# Patient Record
Sex: MALE | Race: WHITE | NOT HISPANIC OR LATINO | URBAN - METROPOLITAN AREA
[De-identification: names, ages, dates, MRNs, and addresses within clinical notes are randomized per-mention and may not be internally consistent; named-entity substitution may affect disease eponyms.]

---

## 2022-10-10 ENCOUNTER — TELEPHONE (OUTPATIENT)
Dept: GASTROENTEROLOGY | Facility: CLINIC | Age: 57
End: 2022-10-10

## 2022-10-10 NOTE — TELEPHONE ENCOUNTER
[facility] Call Center    Phone Message    May a detailed message be left on voicemail: yes     Reason for Call: Other: Pt called in wanting to schedule an appt. Per pt, he was seen in Hospital in [facility] and referred to GI at [facility] on the 3rd of this month. Pt states referral got sent in on 3rd, no referral on pt's file. Pt stated wants to schedule an appt as he is in a lot of pain.     Action Taken: Other: gi triage    Travel Screening: Not Applicable

## 2022-10-11 ENCOUNTER — TRANSCRIBE ORDERS (OUTPATIENT)
Dept: OTHER | Age: 57
End: 2022-10-11

## 2022-10-11 DIAGNOSIS — K70.31 ALCOHOLIC CIRRHOSIS OF LIVER WITH ASCITES (H): Primary | ICD-10-CM

## 2022-10-11 DIAGNOSIS — K40.90 RIGHT INGUINAL HERNIA: ICD-10-CM

## 2022-10-11 DIAGNOSIS — K42.9 UMBILICAL HERNIA WITHOUT OBSTRUCTION AND WITHOUT GANGRENE: ICD-10-CM

## 2022-10-13 NOTE — TELEPHONE ENCOUNTER
Patient referral is for the Hepatology clinic, patient is scheduled appropriately.  Closing encounter

## 2022-10-28 NOTE — TELEPHONE ENCOUNTER
DIAGNOSIS: Alcoholic cirrhosis of liver with ascites    Appt Date: 11.29.2022   NOTES STATUS DETAILS   OFFICE NOTE from referring provider Care Everywhere 10.04.2022  Ericka Wise PA-C   [facility]      OFFICE NOTES from other specialists     DISCHARGE SUMMARY from hospital     MEDICATION LIST Care Everywhere    LIVER BIOSPY (IF APPLICABLE)      PATHOLOGY REPORTS      IMAGING     ENDOSCOPY (IF AVAILABLE)     COLONOSCOPY (IF AVAILABLE) Care Everywhere 12.05.2018   ULTRASOUND LIVER Care Everywhere 09.12.2022 US Paracentesis     09.08.2022, 06.05.2022 US Abd RUQ   CT OF ABDOMEN     MRI OF LIVER     FIBROSCAN, US ELASTOGRAPHY, FIBROSIS SCAN, MR ELASTOGRAPHY     LABS     HEPATIC PANEL (LIVER PANEL) Care Everywhere 09.22.2022   BASIC METABOLIC PANEL Care Everywhere 09.22.2022   COMPLETE METABOLIC PANEL Care Everywhere 09.22.2022   COMPLETE BLOOD COUNT (CBC) Care Everywhere 09.22.2022   INTERNATIONAL NORMALIZED RATIO (INR) Care Everywhere 09.22.2022   HEPATITIS C ANTIBODY     HEPATITIS C VIRAL LOAD/PCR     HEPATITIS C GENOTYPE     HEPATITIS B SURFACE ANTIGEN Internal 06.09.2022   HEPATITIS B SURFACE ANTIBODY Internal 06.09.2022   HEPATITIS B DNA QUANT LEVEL     HEPATITIS B CORE ANTIBODY       Action 10.28.2022 RM   Action Taken Called [facility] [phone] spoke to rep who will be pushing over images, pending     Action 11.16.2022 RM   Action Taken Images received and uploaded to chart

## 2022-11-15 DIAGNOSIS — K70.31 ALCOHOLIC CIRRHOSIS OF LIVER WITH ASCITES (H): Primary | ICD-10-CM

## 2022-11-29 ENCOUNTER — PRE VISIT (OUTPATIENT)
Dept: GASTROENTEROLOGY | Facility: CLINIC | Age: 57
End: 2022-11-29

## 2022-11-29 ENCOUNTER — OFFICE VISIT (OUTPATIENT)
Dept: GASTROENTEROLOGY | Facility: CLINIC | Age: 57
End: 2022-11-29
Attending: PHYSICIAN ASSISTANT
Payer: MEDICARE

## 2022-11-29 ENCOUNTER — LAB (OUTPATIENT)
Dept: LAB | Facility: CLINIC | Age: 57
End: 2022-11-29
Attending: PHYSICIAN ASSISTANT
Payer: MEDICARE

## 2022-11-29 VITALS
BODY MASS INDEX: 29.04 KG/M2 | TEMPERATURE: 98.7 F | HEART RATE: 81 BPM | DIASTOLIC BLOOD PRESSURE: 74 MMHG | WEIGHT: 196.1 LBS | OXYGEN SATURATION: 100 % | SYSTOLIC BLOOD PRESSURE: 128 MMHG | HEIGHT: 69 IN | RESPIRATION RATE: 20 BRPM

## 2022-11-29 DIAGNOSIS — K40.90 RIGHT INGUINAL HERNIA: ICD-10-CM

## 2022-11-29 DIAGNOSIS — K70.31 ALCOHOLIC CIRRHOSIS OF LIVER WITH ASCITES (H): ICD-10-CM

## 2022-11-29 DIAGNOSIS — Z11.59 ENCOUNTER FOR SCREENING FOR OTHER VIRAL DISEASES: ICD-10-CM

## 2022-11-29 DIAGNOSIS — K42.9 UMBILICAL HERNIA WITHOUT OBSTRUCTION AND WITHOUT GANGRENE: ICD-10-CM

## 2022-11-29 LAB
ALBUMIN SERPL BCG-MCNC: 4.1 G/DL (ref 3.5–5.2)
ALP SERPL-CCNC: 103 U/L (ref 40–129)
ALT SERPL W P-5'-P-CCNC: 18 U/L (ref 10–50)
ANION GAP SERPL CALCULATED.3IONS-SCNC: 11 MMOL/L (ref 7–15)
AST SERPL W P-5'-P-CCNC: 24 U/L (ref 10–50)
BILIRUB DIRECT SERPL-MCNC: 0.25 MG/DL (ref 0–0.3)
BILIRUB SERPL-MCNC: 0.7 MG/DL
BUN SERPL-MCNC: 16 MG/DL (ref 8–23)
CALCIUM SERPL-MCNC: 9.6 MG/DL (ref 8.6–10)
CHLORIDE SERPL-SCNC: 104 MMOL/L (ref 98–107)
CREAT SERPL-MCNC: 0.76 MG/DL (ref 0.67–1.17)
DEPRECATED HCO3 PLAS-SCNC: 26 MMOL/L (ref 22–29)
ERYTHROCYTE [DISTWIDTH] IN BLOOD BY AUTOMATED COUNT: 13.7 % (ref 10–15)
GFR SERPL CREATININE-BSD FRML MDRD: >90 ML/MIN/1.73M2
GLUCOSE SERPL-MCNC: 89 MG/DL (ref 70–99)
HCT VFR BLD AUTO: 34.8 % (ref 40–53)
HGB BLD-MCNC: 11.7 G/DL (ref 13.3–17.7)
INR PPP: 1.24 (ref 0.85–1.15)
MCH RBC QN AUTO: 34 PG (ref 26.5–33)
MCHC RBC AUTO-ENTMCNC: 33.6 G/DL (ref 31.5–36.5)
MCV RBC AUTO: 101 FL (ref 78–100)
PLATELET # BLD AUTO: 51 10E3/UL (ref 150–450)
POTASSIUM SERPL-SCNC: 4.1 MMOL/L (ref 3.4–5.3)
PROT SERPL-MCNC: 6.7 G/DL (ref 6.4–8.3)
RBC # BLD AUTO: 3.44 10E6/UL (ref 4.4–5.9)
SODIUM SERPL-SCNC: 141 MMOL/L (ref 136–145)
WBC # BLD AUTO: 3.1 10E3/UL (ref 4–11)

## 2022-11-29 PROCEDURE — G0463 HOSPITAL OUTPT CLINIC VISIT: HCPCS

## 2022-11-29 PROCEDURE — 80053 COMPREHEN METABOLIC PANEL: CPT | Performed by: PATHOLOGY

## 2022-11-29 PROCEDURE — 85027 COMPLETE CBC AUTOMATED: CPT | Performed by: PATHOLOGY

## 2022-11-29 PROCEDURE — 99205 OFFICE O/P NEW HI 60 MIN: CPT | Performed by: PHYSICIAN ASSISTANT

## 2022-11-29 PROCEDURE — 82248 BILIRUBIN DIRECT: CPT | Performed by: PATHOLOGY

## 2022-11-29 PROCEDURE — 85610 PROTHROMBIN TIME: CPT | Performed by: PATHOLOGY

## 2022-11-29 PROCEDURE — 36415 COLL VENOUS BLD VENIPUNCTURE: CPT | Performed by: PATHOLOGY

## 2022-11-29 RX ORDER — DOCUSATE SODIUM 100 MG/1
100 CAPSULE, LIQUID FILLED ORAL 2 TIMES DAILY
COMMUNITY
Start: 2022-11-06 | End: 2022-12-06

## 2022-11-29 RX ORDER — TRIAMCINOLONE ACETONIDE 1 MG/G
CREAM TOPICAL
COMMUNITY
Start: 2022-11-09

## 2022-11-29 RX ORDER — OMEPRAZOLE 40 MG/1
1 CAPSULE, DELAYED RELEASE ORAL 2 TIMES DAILY
COMMUNITY
Start: 2022-10-03

## 2022-11-29 RX ORDER — POLYETHYLENE GLYCOL 3350 17 G/17G
17 POWDER, FOR SOLUTION ORAL DAILY
COMMUNITY
Start: 2022-11-06 | End: 2022-12-06

## 2022-11-29 RX ORDER — LACTULOSE 10 G/15ML
60 SOLUTION ORAL DAILY
COMMUNITY
Start: 2022-06-12 | End: 2022-11-29

## 2022-11-29 RX ORDER — IBUPROFEN 600 MG/1
800 TABLET, FILM COATED ORAL 2 TIMES DAILY PRN
COMMUNITY
Start: 2022-11-15

## 2022-11-29 RX ORDER — CHOLECALCIFEROL (VITAMIN D3) 25 MCG
1000 TABLET ORAL DAILY
COMMUNITY
Start: 2022-06-12

## 2022-11-29 RX ORDER — METHOCARBAMOL 500 MG/1
1 TABLET, FILM COATED ORAL 2 TIMES DAILY
COMMUNITY
Start: 2022-11-15

## 2022-11-29 RX ORDER — TRAMADOL HYDROCHLORIDE 50 MG/1
0.5 TABLET ORAL 2 TIMES DAILY PRN
COMMUNITY
Start: 2022-11-15 | End: 2023-03-29

## 2022-11-29 ASSESSMENT — PAIN SCALES - GENERAL: PAINLEVEL: NO PAIN (0)
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
## 2022-11-29 NOTE — PATIENT INSTRUCTIONS
Please let us know if you need a referral for surgery at [facility].     It was a pleasure to see you at your recent visit. Please let me know if you have any questions or concerns.     Clinic Staff - [phone] option 3     Sincerely,     Little Gonzalez PA-C   [address] [place] [address], Mail Code [id number]  [place]  [address].
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
## 2022-11-29 NOTE — NURSING NOTE
"Chief Complaint   Patient presents with     Consult     alcohol liver disease     Vital signs:  Temp: 98.7  F (37.1  C) Temp src: Oral BP: 128/74 Pulse: 81   Resp: 20 SpO2: 100 %     Height: 175.3 cm (5' 9\") Weight: 89 kg (196 lb 1.6 oz)  Estimated body mass index is 28.96 kg/m  as calculated from the following:    Height as of this encounter: 1.753 m (5' 9\").    Weight as of this encounter: 89 kg (196 lb 1.6 oz).        Constanza Zarco, [facility]  11/29/2022 8:53 AM      "

## 2022-12-16 ENCOUNTER — TELEPHONE (OUTPATIENT)
Dept: TRANSPLANT | Facility: CLINIC | Age: 57
End: 2022-12-16

## 2022-12-16 NOTE — TELEPHONE ENCOUNTER
Michael called the [facility] office number;  Is looking to have Inguinal and umbilical hernia repair. Stated he would like to have this done in [facility] but he does not know who to call.  He was last seen here 11/29/2022 by MAXIMO Clark., He would like to discuss with MAXIMO Clark .,      Please call Michael #[phone]

## 2022-12-21 ENCOUNTER — TELEPHONE (OUTPATIENT)
Dept: GASTROENTEROLOGY | Facility: CLINIC | Age: 57
End: 2022-12-21

## 2022-12-21 DIAGNOSIS — K40.90 RIGHT INGUINAL HERNIA: ICD-10-CM

## 2022-12-21 DIAGNOSIS — K42.9 UMBILICAL HERNIA WITHOUT OBSTRUCTION AND WITHOUT GANGRENE: Primary | ICD-10-CM

## 2022-12-21 DIAGNOSIS — K70.31 ALCOHOLIC CIRRHOSIS OF LIVER WITH ASCITES (H): ICD-10-CM

## 2022-12-21 NOTE — TELEPHONE ENCOUNTER
[facility] Call Center    Phone Message    May a detailed message be left on voicemail: yes     Reason for Call: Other: Pt was returning a call. He was thinking it was part of Little Gonzalez's care team. Pt stated he is waiting to get a call back to get his hernia repaired. Pt stated it takes him a while to get to the phone as he has difficulty getting around. Please call him back at [phone]. Thank you.      Action Taken: Other: hepa    Travel Screening: Not Applicable

## 2023-01-06 NOTE — TELEPHONE ENCOUNTER
[facility] Call Center    Phone Message    May a detailed message be left on voicemail: yes     Reason for Call: Other:     Pt called to follow up on their previous call to discuss a referral for hernia repair. Pt is also wishing to discuss if they sholud be having further blood work done.     Action Taken: Message routed to:  [facility]    Travel Screening: Not Applicable

## 2023-01-06 NOTE — CONFIDENTIAL NOTE
Writer called patient for clarification regarding location of hernia repair surgery. The patient said the surgeon in [place] did not feel comfortable proceeding with the hernia repair in the setting of compensated cirrhosis.     Writer communicated this with Little Gonzalez PA-C. It was deemed more appropriate to have the patient complete hernia repair surgery at the [facility] given the surgeon in [place] was uncomfortable performing the surgery. Referral placed by Little Gonzalez PA-C.    Patient agreeable and understanding of the plan.    LEISA Sparrow, RN, PHN  Clinic 3A  Hepatology  [facility]

## 2023-01-16 NOTE — TELEPHONE ENCOUNTER
[facility] Call Center    Phone Message    May a detailed message be left on voicemail: yes     Reason for Call: Other: Pt called following up and requesting a call back in regards to getting hernias repaired. Pt also asked if he needed to go to [place] for labs. Per pt he has a count of fifty percent. Please call pt back at [phone]     Action Taken: Other: hepa    Travel Screening: Not Applicable

## 2023-01-16 NOTE — CONFIDENTIAL NOTE
Returned patient's call. Explained to patient a referral for hernia repair surgery was placed today and labs were not needed for it as he just had them done. Phone number for the [facility] given for patient to schedule the surgery.    GIULIANA SparrowN, RN, PHN  Clinic 3A  Hepatology  [facility]

## 2023-01-19 ENCOUNTER — PREP FOR PROCEDURE (OUTPATIENT)
Dept: SURGERY | Facility: CLINIC | Age: 58
End: 2023-01-19

## 2023-01-19 ENCOUNTER — ANCILLARY PROCEDURE (OUTPATIENT)
Dept: ULTRASOUND IMAGING | Facility: CLINIC | Age: 58
End: 2023-01-19
Attending: PHYSICIAN ASSISTANT
Payer: MEDICARE

## 2023-01-19 ENCOUNTER — OFFICE VISIT (OUTPATIENT)
Dept: SURGERY | Facility: CLINIC | Age: 58
End: 2023-01-19
Payer: MEDICARE

## 2023-01-19 VITALS
DIASTOLIC BLOOD PRESSURE: 69 MMHG | HEIGHT: 69 IN | SYSTOLIC BLOOD PRESSURE: 120 MMHG | HEART RATE: 93 BPM | WEIGHT: 202.8 LBS | OXYGEN SATURATION: 99 % | BODY MASS INDEX: 30.04 KG/M2

## 2023-01-19 DIAGNOSIS — K40.90 NON-RECURRENT UNILATERAL INGUINAL HERNIA WITHOUT OBSTRUCTION OR GANGRENE: Primary | ICD-10-CM

## 2023-01-19 DIAGNOSIS — K42.9 UMBILICAL HERNIA WITHOUT OBSTRUCTION AND WITHOUT GANGRENE: ICD-10-CM

## 2023-01-19 DIAGNOSIS — K70.31 ALCOHOLIC CIRRHOSIS OF LIVER WITH ASCITES (H): ICD-10-CM

## 2023-01-19 DIAGNOSIS — K40.90 RIGHT INGUINAL HERNIA: ICD-10-CM

## 2023-01-19 PROCEDURE — 76705 ECHO EXAM OF ABDOMEN: CPT | Performed by: RADIOLOGY

## 2023-01-19 PROCEDURE — 99203 OFFICE O/P NEW LOW 30 MIN: CPT | Performed by: SURGERY

## 2023-01-19 RX ORDER — CEFAZOLIN SODIUM 2 G/50ML
2 SOLUTION INTRAVENOUS
Status: CANCELLED | OUTPATIENT
Start: 2023-01-19

## 2023-01-19 RX ORDER — ACETAMINOPHEN 325 MG/1
325-650 TABLET ORAL EVERY 6 HOURS PRN
COMMUNITY

## 2023-01-19 RX ORDER — CEFAZOLIN SODIUM 2 G/50ML
2 SOLUTION INTRAVENOUS SEE ADMIN INSTRUCTIONS
Status: CANCELLED | OUTPATIENT
Start: 2023-01-19

## 2023-01-19 ASSESSMENT — PAIN SCALES - GENERAL: PAINLEVEL: NO PAIN (0)
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
## 2023-01-19 NOTE — NURSING NOTE
"Chief Complaint   Patient presents with     New Patient     Umbilical hernia, inguinal hernia, cirrhosis and ascites       Vitals:    01/19/23 0848   BP: 120/69   BP Location: Left arm   Patient Position: Sitting   Cuff Size: Adult Large   Pulse: 93   SpO2: 99%   Weight: 92 kg (202 lb 12.8 oz)   Height: 1.753 m (5' 9\")       Body mass index is 29.95 kg/m .                          Gaston Crawford, EMT    " Attending with

## 2023-01-20 ENCOUNTER — TELEPHONE (OUTPATIENT)
Dept: SURGERY | Facility: CLINIC | Age: 58
End: 2023-01-20
Payer: MEDICARE

## 2023-01-20 PROBLEM — K42.9 UMBILICAL HERNIA WITHOUT OBSTRUCTION AND WITHOUT GANGRENE: Status: ACTIVE | Noted: 2023-01-20

## 2023-01-20 PROBLEM — K40.90 NON-RECURRENT UNILATERAL INGUINAL HERNIA WITHOUT OBSTRUCTION OR GANGRENE: Status: ACTIVE | Noted: 2023-01-20

## 2023-01-20 NOTE — TELEPHONE ENCOUNTER
Patient seen in clinic with Dr. Isaacs yesterday, calling to schedule hernia repair. Scheduled 3/29/23 at the [facility], laparoscopic umbilical hernia repair and right inguinal hernia repair with robotic assist, 150 minutes. Will call patient back with case start time, to arrive 90 minutes prior, 5th floor [facility]. Patient states he will be getting his pre-op H&P at his primary care clinic which is [facility] in [place].

## 2023-01-20 NOTE — TELEPHONE ENCOUNTER
Called patient back with case start time for his hernia surgery on 3/29. It is 7:15 a.m., will need to arrive at 5:45 a.m. Advised patient that his pre-op cannot be more than 30 days prior to surgery. He will call [facility] to schedule. He had an ultrasound yesterday and wanted to know the result. I told him I would pass that request on to Dr. Isaacs's RN Vivien to discuss with patient.

## 2023-03-28 ENCOUNTER — ANESTHESIA EVENT (OUTPATIENT)
Dept: SURGERY | Facility: AMBULATORY SURGERY CENTER | Age: 58
End: 2023-03-28
Payer: MEDICARE

## 2023-03-29 ENCOUNTER — HOSPITAL ENCOUNTER (OUTPATIENT)
Facility: AMBULATORY SURGERY CENTER | Age: 58
Discharge: HOME | End: 2023-03-29
Attending: SURGERY
Payer: MEDICARE

## 2023-03-29 ENCOUNTER — ANESTHESIA (OUTPATIENT)
Dept: SURGERY | Facility: AMBULATORY SURGERY CENTER | Age: 58
End: 2023-03-29
Payer: MEDICARE

## 2023-03-29 VITALS
DIASTOLIC BLOOD PRESSURE: 66 MMHG | WEIGHT: 202 LBS | BODY MASS INDEX: 29.92 KG/M2 | HEIGHT: 69 IN | HEART RATE: 68 BPM | TEMPERATURE: 98.4 F | RESPIRATION RATE: 16 BRPM | SYSTOLIC BLOOD PRESSURE: 148 MMHG | OXYGEN SATURATION: 98 %

## 2023-03-29 DIAGNOSIS — K40.90 NON-RECURRENT UNILATERAL INGUINAL HERNIA WITHOUT OBSTRUCTION OR GANGRENE: ICD-10-CM

## 2023-03-29 DIAGNOSIS — K42.9 UMBILICAL HERNIA WITHOUT OBSTRUCTION AND WITHOUT GANGRENE: ICD-10-CM

## 2023-03-29 PROCEDURE — 49650 LAP ING HERNIA REPAIR INIT: CPT | Mod: RT

## 2023-03-29 PROCEDURE — 49650 LAP ING HERNIA REPAIR INIT: CPT | Mod: RT | Performed by: SURGERY

## 2023-03-29 DEVICE — MESH PROGRIP LAPAROSCOPIC 5.9X3.9" PARIETEX SELF-FIX LPG1510: Type: IMPLANTABLE DEVICE | Site: ABDOMEN | Status: FUNCTIONAL

## 2023-03-29 RX ORDER — FENTANYL CITRATE 50 UG/ML
25 INJECTION, SOLUTION INTRAMUSCULAR; INTRAVENOUS EVERY 5 MIN PRN
Status: DISCONTINUED | OUTPATIENT
Start: 2023-03-29 | End: 2023-03-30 | Stop reason: HOSPADM

## 2023-03-29 RX ORDER — POLYETHYLENE GLYCOL 3350 17 G/17G
1 POWDER, FOR SOLUTION ORAL DAILY
COMMUNITY

## 2023-03-29 RX ORDER — PROPOFOL 10 MG/ML
INJECTION, EMULSION INTRAVENOUS PRN
Status: DISCONTINUED | OUTPATIENT
Start: 2023-03-29 | End: 2023-03-29

## 2023-03-29 RX ORDER — OXYCODONE HYDROCHLORIDE 5 MG/1
5 TABLET ORAL
Status: COMPLETED | OUTPATIENT
Start: 2023-03-29 | End: 2023-03-29

## 2023-03-29 RX ORDER — LIDOCAINE 40 MG/G
CREAM TOPICAL
Status: DISCONTINUED | OUTPATIENT
Start: 2023-03-29 | End: 2023-03-30 | Stop reason: HOSPADM

## 2023-03-29 RX ORDER — TRAMADOL HYDROCHLORIDE 50 MG/1
25 TABLET ORAL
COMMUNITY
Start: 2023-03-28 | End: 2023-03-29

## 2023-03-29 RX ORDER — HYDROMORPHONE HYDROCHLORIDE 1 MG/ML
0.2 INJECTION, SOLUTION INTRAMUSCULAR; INTRAVENOUS; SUBCUTANEOUS EVERY 5 MIN PRN
Status: DISCONTINUED | OUTPATIENT
Start: 2023-03-29 | End: 2023-03-30 | Stop reason: HOSPADM

## 2023-03-29 RX ORDER — CEFAZOLIN SODIUM 2 G/50ML
2 SOLUTION INTRAVENOUS SEE ADMIN INSTRUCTIONS
Status: DISCONTINUED | OUTPATIENT
Start: 2023-03-29 | End: 2023-03-30 | Stop reason: HOSPADM

## 2023-03-29 RX ORDER — SODIUM CHLORIDE, SODIUM LACTATE, POTASSIUM CHLORIDE, CALCIUM CHLORIDE 600; 310; 30; 20 MG/100ML; MG/100ML; MG/100ML; MG/100ML
INJECTION, SOLUTION INTRAVENOUS CONTINUOUS
Status: DISCONTINUED | OUTPATIENT
Start: 2023-03-29 | End: 2023-03-30 | Stop reason: HOSPADM

## 2023-03-29 RX ORDER — ONDANSETRON 2 MG/ML
4 INJECTION INTRAMUSCULAR; INTRAVENOUS EVERY 30 MIN PRN
Status: DISCONTINUED | OUTPATIENT
Start: 2023-03-29 | End: 2023-03-30 | Stop reason: HOSPADM

## 2023-03-29 RX ORDER — OXYCODONE HYDROCHLORIDE 5 MG/1
5 TABLET ORAL EVERY 6 HOURS PRN
Qty: 6 TABLET | Refills: 0 | Status: SHIPPED | OUTPATIENT
Start: 2023-03-29 | End: 2023-04-01

## 2023-03-29 RX ORDER — ACETAMINOPHEN 325 MG/1
650 TABLET ORAL
Status: DISCONTINUED | OUTPATIENT
Start: 2023-03-29 | End: 2023-03-30 | Stop reason: HOSPADM

## 2023-03-29 RX ORDER — KETOROLAC TROMETHAMINE 30 MG/ML
INJECTION, SOLUTION INTRAMUSCULAR; INTRAVENOUS PRN
Status: DISCONTINUED | OUTPATIENT
Start: 2023-03-29 | End: 2023-03-29

## 2023-03-29 RX ORDER — DEXAMETHASONE SODIUM PHOSPHATE 4 MG/ML
INJECTION, SOLUTION INTRA-ARTICULAR; INTRALESIONAL; INTRAMUSCULAR; INTRAVENOUS; SOFT TISSUE PRN
Status: DISCONTINUED | OUTPATIENT
Start: 2023-03-29 | End: 2023-03-29

## 2023-03-29 RX ORDER — CEFAZOLIN SODIUM 2 G/50ML
2 SOLUTION INTRAVENOUS
Status: COMPLETED | OUTPATIENT
Start: 2023-03-29 | End: 2023-03-29

## 2023-03-29 RX ORDER — DOCUSATE SODIUM 100 MG/1
1 CAPSULE, LIQUID FILLED ORAL 2 TIMES DAILY
COMMUNITY
Start: 2023-03-09 | End: 2023-04-08

## 2023-03-29 RX ORDER — PROPOFOL 10 MG/ML
INJECTION, EMULSION INTRAVENOUS CONTINUOUS PRN
Status: DISCONTINUED | OUTPATIENT
Start: 2023-03-29 | End: 2023-03-29

## 2023-03-29 RX ORDER — KETAMINE HYDROCHLORIDE 10 MG/ML
INJECTION INTRAMUSCULAR; INTRAVENOUS PRN
Status: DISCONTINUED | OUTPATIENT
Start: 2023-03-29 | End: 2023-03-29

## 2023-03-29 RX ORDER — FENTANYL CITRATE 50 UG/ML
50 INJECTION, SOLUTION INTRAMUSCULAR; INTRAVENOUS EVERY 5 MIN PRN
Status: DISCONTINUED | OUTPATIENT
Start: 2023-03-29 | End: 2023-03-30 | Stop reason: HOSPADM

## 2023-03-29 RX ORDER — ONDANSETRON 4 MG/1
4 TABLET, ORALLY DISINTEGRATING ORAL EVERY 30 MIN PRN
Status: DISCONTINUED | OUTPATIENT
Start: 2023-03-29 | End: 2023-03-30 | Stop reason: HOSPADM

## 2023-03-29 RX ORDER — BUPIVACAINE HYDROCHLORIDE 2.5 MG/ML
INJECTION, SOLUTION INFILTRATION; PERINEURAL PRN
Status: DISCONTINUED | OUTPATIENT
Start: 2023-03-29 | End: 2023-03-29 | Stop reason: HOSPADM

## 2023-03-29 RX ORDER — TRAMADOL HYDROCHLORIDE 50 MG/1
50 TABLET ORAL EVERY 6 HOURS PRN
Qty: 6 TABLET | Refills: 0 | Status: CANCELLED | OUTPATIENT
Start: 2023-03-29 | End: 2023-04-01

## 2023-03-29 RX ORDER — HYDROMORPHONE HYDROCHLORIDE 1 MG/ML
0.4 INJECTION, SOLUTION INTRAMUSCULAR; INTRAVENOUS; SUBCUTANEOUS EVERY 5 MIN PRN
Status: DISCONTINUED | OUTPATIENT
Start: 2023-03-29 | End: 2023-03-30 | Stop reason: HOSPADM

## 2023-03-29 RX ORDER — ONDANSETRON 2 MG/ML
INJECTION INTRAMUSCULAR; INTRAVENOUS PRN
Status: DISCONTINUED | OUTPATIENT
Start: 2023-03-29 | End: 2023-03-29

## 2023-03-29 RX ORDER — FENTANYL CITRATE 50 UG/ML
INJECTION, SOLUTION INTRAMUSCULAR; INTRAVENOUS PRN
Status: DISCONTINUED | OUTPATIENT
Start: 2023-03-29 | End: 2023-03-29

## 2023-03-29 RX ORDER — ACETAMINOPHEN 325 MG/1
975 TABLET ORAL ONCE
Status: DISCONTINUED | OUTPATIENT
Start: 2023-03-29 | End: 2023-03-30 | Stop reason: HOSPADM

## 2023-03-29 RX ORDER — GLYCOPYRROLATE 0.2 MG/ML
INJECTION, SOLUTION INTRAMUSCULAR; INTRAVENOUS PRN
Status: DISCONTINUED | OUTPATIENT
Start: 2023-03-29 | End: 2023-03-29

## 2023-03-29 RX ORDER — LIDOCAINE HYDROCHLORIDE 20 MG/ML
INJECTION, SOLUTION INFILTRATION; PERINEURAL PRN
Status: DISCONTINUED | OUTPATIENT
Start: 2023-03-29 | End: 2023-03-29

## 2023-03-29 RX ORDER — VITAMIN B COMPLEX
1000 TABLET ORAL
COMMUNITY
Start: 2022-06-12

## 2023-03-29 RX ORDER — DULOXETIN HYDROCHLORIDE 60 MG/1
60 CAPSULE, DELAYED RELEASE ORAL
COMMUNITY
Start: 2023-03-21 | End: 2024-03-20

## 2023-03-29 RX ORDER — SODIUM CHLORIDE, SODIUM LACTATE, POTASSIUM CHLORIDE, CALCIUM CHLORIDE 600; 310; 30; 20 MG/100ML; MG/100ML; MG/100ML; MG/100ML
INJECTION, SOLUTION INTRAVENOUS CONTINUOUS PRN
Status: DISCONTINUED | OUTPATIENT
Start: 2023-03-29 | End: 2023-03-29

## 2023-03-29 RX ADMIN — FENTANYL CITRATE 100 MCG: 50 INJECTION, SOLUTION INTRAMUSCULAR; INTRAVENOUS at 07:23

## 2023-03-29 RX ADMIN — GLYCOPYRROLATE 0.2 MG: 0.2 INJECTION, SOLUTION INTRAMUSCULAR; INTRAVENOUS at 07:43

## 2023-03-29 RX ADMIN — OXYCODONE HYDROCHLORIDE 5 MG: 5 TABLET ORAL at 10:01

## 2023-03-29 RX ADMIN — FENTANYL CITRATE 50 MCG: 50 INJECTION, SOLUTION INTRAMUSCULAR; INTRAVENOUS at 09:42

## 2023-03-29 RX ADMIN — CEFAZOLIN SODIUM 2 G: 2 SOLUTION INTRAVENOUS at 07:14

## 2023-03-29 RX ADMIN — PROPOFOL 200 MCG/KG/MIN: 10 INJECTION, EMULSION INTRAVENOUS at 07:24

## 2023-03-29 RX ADMIN — KETOROLAC TROMETHAMINE 30 MG: 30 INJECTION, SOLUTION INTRAMUSCULAR; INTRAVENOUS at 09:06

## 2023-03-29 RX ADMIN — Medication 50 MG: at 07:23

## 2023-03-29 RX ADMIN — LIDOCAINE HYDROCHLORIDE 100 MG: 20 INJECTION, SOLUTION INFILTRATION; PERINEURAL at 07:23

## 2023-03-29 RX ADMIN — DEXAMETHASONE SODIUM PHOSPHATE 4 MG: 4 INJECTION, SOLUTION INTRA-ARTICULAR; INTRALESIONAL; INTRAMUSCULAR; INTRAVENOUS; SOFT TISSUE at 07:30

## 2023-03-29 RX ADMIN — KETAMINE HYDROCHLORIDE 30 MG: 10 INJECTION INTRAMUSCULAR; INTRAVENOUS at 07:53

## 2023-03-29 RX ADMIN — PROPOFOL 300 MG: 10 INJECTION, EMULSION INTRAVENOUS at 07:23

## 2023-03-29 RX ADMIN — SODIUM CHLORIDE, SODIUM LACTATE, POTASSIUM CHLORIDE, CALCIUM CHLORIDE: 600; 310; 30; 20 INJECTION, SOLUTION INTRAVENOUS at 07:14

## 2023-03-29 RX ADMIN — Medication 10 MG: at 08:27

## 2023-03-29 RX ADMIN — Medication 10 MG: at 08:25

## 2023-03-29 RX ADMIN — FENTANYL CITRATE 50 MCG: 50 INJECTION, SOLUTION INTRAMUSCULAR; INTRAVENOUS at 09:35

## 2023-03-29 RX ADMIN — ONDANSETRON 4 MG: 2 INJECTION INTRAMUSCULAR; INTRAVENOUS at 09:06

## 2023-03-29 RX ADMIN — Medication 0.5 MG: at 08:30

## 2023-03-29 NOTE — OP NOTE
[facility]    Operative Note    Pre-operative diagnosis: Non-recurrent unilateral inguinal hernia without obstruction or gangrene [K40.90]  Umbilical hernia without obstruction and without gangrene [K42.9]   Post-operative diagnosis Same   Procedure: Procedure(s):  Laparoscopic Right Inguinal and Umbilical Hernia Repair, Robotic  Assist   Surgeon: Surgeon(s) and Role:     * Jed Isaacs MD - Primary   Assistant Surgeon      Anesthesia: Jed Mcconnell MD - Resident      General    Estimated blood loss: 40ml   Drains: None   Specimens: * No specimens in log *   Findings: Intraabdominal findings:  No significant adhesions.    RIGHT side findings:  A large hernia with a large hernia sac was found at the indirect location.    Contents of hernia included: nothing;     There was small cord lipoma.    The contents of the spermatic cord were identified and preserved.    Mesh placed to cover hernia defects.    Umbilical findings:  A small umbilical hernia was identified with did not contain any intraabdominal contents       Complications: None.   Implants: Progrip [id number] mesh         INDICATIONS FOR PROCEDURE  OPERATIVE INDICATIONS:  Adam Salcido is a 57 year old male with a history of a right inguinal and umbilical bulges.      After understanding the risks and benefits of proceeding with surgery, the patient has an indication for laparoscopic inguinal and umbilical hernia repairs with robotic assistance and consented to undergo surgery.    I reviewed the general risks of surgery in clinic or on the day of surgery.    These include, but are not limited to, death, myocardial infarction, pneumonia, urinary tract infection, deep venous thrombosis with or without pulmonary embolus, abdominal infection from bowel injury or abscess, bowel obstruction, wound infection, and bleeding.    The risks of hernia repair were reviewed with the patient.    These risks combine  the risks of abdominal surgery and the risks of hernia repair, including mesh implantation, and were described to the patient as follows:    Abdominal surgery risks:    These include, but are not limited to, death, myocardial infarction, pneumonia, urinary tract infection, deep venous thrombosis with or without pulmonary embolus, abdominal infection from bowel injury or abscess, bowel obstruction, wound infection, and bleeding.    Hernia repair risks:    Food and Drug Administration Comments on Hernia Repair Surgery and Mesh Implantation.    http://www.fda.gov/MedicalDevices/ProductsandMedicalProcedures/ImplantsandProsthetics/HerniaSurgicalMesh/default.htm      Hernia Repair Complications    Based on FDA s analysis of medical device adverse event reports and of peer-reviewed, scientific literature, the most common adverse events for all surgical repair of hernias--with or without mesh--are pain, infection, hernia recurrence, scar-like tissue that sticks tissues together (adhesion), blockage of the large or small intestine (obstruction), bleeding, abnormal connection between organs, vessels, or intestines (fistula), fluid build-up at the surgical site (seroma), and a hole in neighboring tissues or organs (perforation).    The most common adverse events following hernia repair with mesh are pain, infection, hernia recurrence, adhesion, and bowel obstruction. Some other potential adverse events that can occur following hernia repair with mesh are mesh migration and mesh shrinkage (contraction).    Many complications related to hernia repair with surgical mesh that have been reported to the FDA have been associated with recalled mesh products that are no longer on the market. Pain, infection, recurrence, adhesion, obstruction, and perforation are the most common complications associated with recalled mesh. In the FDA s analysis of medical adverse event reports to the FDA, recalled mesh products were the main cause of  bowel perforation and obstruction complications.    Please refer to the recall notices here and here for more information if you have recalled mesh. For more information on the recalled products, please visit the FDA Medical Device Recall website. Please visit the Medical & Radiation Emitting Device Database to search a specific type of surgical mesh.    If you are unsure about the specific mesh  and brand used in your surgery and have questions about your hernia repair, contact your surgeon or the facility where your surgery was performed to obtain the information from your medical record.     OPERATIVE PROCEDURE:     Adam Salcido was brought to the operating room and prepared in routine fashion.  The operating room table was . Under the benefits of general anesthesia, a left upper quadrant Veress needle was inserted and pneumoperitoneum was established using carbon dioxide gas to a maximum pressure of 15 mmHg.     A total of three 8-mm ports were placed into the abdomen, the second two under direct optical visualization.    Mesh and suture were placed at this time.    Targeting towards bladder was done.    The robotic arms were docked with robot on patient's left side.    The scissors and grasping robotic tools were placed in lateral ports with camera in between.    The operation was started by examining the right and left groin with findings as noted above.      Right Side:  The peritoneum from anterior superior iliac spine was divided using scissors and a peritoneal flap was developed inferiorly, allowing access to the spermatic cord.  Dissection laterally and medially allowed pocked formation for mesh implant.    A complete dissection of the space was performed by pushing peritoneum down and elevating the inferior epigastric arteries.    The spermatic cord was dissected and peritoneal sac was dissected away from the cord structures; in this manner the vas deferens was identified and  preserved.    Cord lipoma was reduced.    The peritoneal reflection was dissected away from the lower edge of the mesh location.    Mesh was placed anteriorly in the properitoneal space and unfurled from superior to inferior and posteriorly.  This covered all groin hernia defects.    Hemostasis was ensured and the peritoneal flap was sutured closed using 2-0 barbed, absorbable suture.     Umbilical repair:    Using a suture passer the umbilical defect was closed with 0 vicryl suture in a figure of eight fashion.    The needles were removed.    All ports were removed from the abdomen.        The skin was closed at the 3 incisions using 4-0 monocryl suture and skin glue was applied.    I was present for all critical components of the operation and all needle and sponge counts were correct x2 at the end of the procedure.    Jed Isaacs MD  Surgery  [phone] (hospital )        Operative report initially prepared by:  Jed Mcconnell MD

## 2023-03-29 NOTE — ANESTHESIA PREPROCEDURE EVALUATION
Anesthesia Pre-Procedure Evaluation    Patient: Adam Salcido   MRN: [id number] : 1965        Procedure : Procedure(s):  Laparoscopic Right Inguinal and Umbilical Hernia Repair, Robotic  Assist          No past medical history on file.   History reviewed. No pertinent surgical history.   No Known Allergies   Social History     Tobacco Use     Smoking status: Never     Smokeless tobacco: Current     Types: Chew   Substance Use Topics     Alcohol use: Not Currently     Comment: Last drink 2022      Wt Readings from Last 1 Encounters:   23 91.6 kg (202 lb)        Anesthesia Evaluation            ROS/MED HX  ENT/Pulmonary:  - neg pulmonary ROS     Neurologic:  - neg neurologic ROS     Cardiovascular:  - neg cardiovascular ROS     METS/Exercise Tolerance: >4 METS    Hematologic:  - neg hematologic  ROS     Musculoskeletal:  - neg musculoskeletal ROS     GI/Hepatic:  - neg GI/hepatic ROS   (+) liver disease (Hx alcohol abuse ),     Renal/Genitourinary:  - neg Renal ROS     Endo:  - neg endo ROS     Psychiatric/Substance Use:  - neg psychiatric ROS     Infectious Disease:  - neg infectious disease ROS     Malignancy:  - neg malignancy ROS     Other:  - neg other ROS          Physical Exam    Airway        Mallampati: II       Respiratory Devices and Support    ETT:      Dental           Cardiovascular          Rhythm and rate: regular     Pulmonary           breath sounds clear to auscultation           OUTSIDE LABS:  CBC:   Lab Results   Component Value Date    WBC 3.1 (L) 2022    HGB 11.7 (L) 2022    HCT 34.8 (L) 2022    PLT 51 (L) 2022     BMP:   Lab Results   Component Value Date     2022    POTASSIUM 4.1 2022    CHLORIDE 104 2022    CO2 26 2022    BUN 16.0 2022    CR 0.76 2022    GLC 89 2022     COAGS:   Lab Results   Component Value Date    INR 1.24 (H) 2022     POC: No results found for: BGM, HCG, HCGS  HEPATIC:   Lab  Results   Component Value Date    ALBUMIN 4.1 11/29/2022    PROTTOTAL 6.7 11/29/2022    ALT 18 11/29/2022    AST 24 11/29/2022    ALKPHOS 103 11/29/2022    BILITOTAL 0.7 11/29/2022     OTHER:   Lab Results   Component Value Date    SONIYA 9.6 11/29/2022       Anesthesia Plan    ASA Status:  2      Anesthesia Type: General.     - Airway: ETT   Induction: Intravenous.   Maintenance: Balanced.        Consents    Anesthesia Plan(s) and associated risks, benefits, and realistic alternatives discussed. Questions answered and patient/representative(s) expressed understanding.     - Discussed: Risks, Benefits and Alternatives for BOTH SEDATION and the PROCEDURE were discussed     - Discussed with:  Patient         Postoperative Care    Pain management: Multi-modal analgesia.   PONV prophylaxis: Ondansetron (or other 5HT-3), Dexamethasone or Solumedrol     Comments:                Giovanni Gamble MD

## 2023-03-29 NOTE — DISCHARGE INSTRUCTIONS
Discharge Instructions   Activity  - No lifting >20 lbs for 2 weeks    Incisions  - Remove the dressing over your belly button in one week.  You may shower with the dressing on  - You may shower and get incisions wet starting 24 hrs after surgery  - Do not scrub incisions or submerge wounds (e.g. bath, pool, hot tub, etc.) for 2 weeks or until the glue or steri-strips have come off    Drain Care  - You do not have a drain.  Specific care/instructions:  Not Applicable    Medications  - Do not take any additional Tylenol (acetaminophen) while using a narcotic pain medication which includes acetaminophen  - Do not take more than 2,000mg of acetaminophen in any 24 hour period, as this can cause liver damage  - Take stool softeners such as Senna or Miralax while you are using narcotics, but stop if you develop diarrhea  - Wean yourself off of narcotic pain medications    Follow-Up:  - Call your primary care provider to touch base regarding your recent admission.    - Call or return sooner than your regularly scheduled visit if you develop any of the following: fever >101.5, uncontrolled pain, uncontrolled nausea or vomiting, as well as increased redness, swelling, or drainage from your wound.   -  A nurse from the General Surgery Clinic will contact you within 24 hours, or the next business day, after your discharge from the hospital.  If you have questions or to schedule a follow up appointment please call the General Surgery Clinic at [phone].  Call [phone] and ask to speak with the Surgery resident on call if you are having troubles in the evenings, at night, or on the weekend.  -  If you had surgery with Dr Isaacs or Dr Palumbo please call [phone] to schedule your follow up appointment or with any questions or concerns.  -  If you are receiving home care please inform your home care nurse of our contact number.         [facility]  Home Care Following  Anesthesia  For 24 hours after surgery:  Get plenty of rest.  A responsible adult must stay with you for at least 24 hours after you leave the surgery center.  Do not drive or use heavy equipment.  If you have weakness or tingling, don't drive or use heavy equipment until this feeling goes away.   Do not drink alcohol.   Avoid strenuous or risky activities.  Ask for help when climbing stairs.  You may feel lightheaded.  IF so, sit for a few minutes before standing.  Have someone help you get up.   If you have nausea (feel sick to your stomach): Drink only clear liquids such as apple juice, ginger ale, broth or 7-Up.  Rest may also help.  Be sure to drink enough fluids.  Move to a regular diet as you feel able.   You may have a slight fever.  Call the doctor if your fever is over 100 F (37.7 C) (taken under the tongue) or lasts longer than 24 hours.  You may have a dry mouth, a sore throat, muscle aches or trouble sleeping. These should go away after 24 hours.  Do not make important or legal decisions.   It is recommended to avoid smoking.               Tips for taking pain medications  To get the best pain relief possible, remember these points:  Take pain medications as directed, before pain becomes severe.  Pain medication can upset your stomach: taking it with food may help.  Constipation is a common side effect of pain medication. Drink plenty of  fluids.  Eat foods high in fiber. Take a stool softener if recommended by your doctor or pharmacist.  Do not drink alcohol, drive or operate machinery while taking pain medications.  Ask about other ways to control pain, such as with heat, ice or relaxation.    Tylenol/Acetaminophen Consumption  To help encourage the safe use of acetaminophen, the makers of TYLENOL  have lowered the maximum daily dose for single-ingredient Extra Strength TYLENOL  (acetaminophen) products sold in the [place] from 8 pills per day (4,000 mg) to 6 pills per day (3,000 mg). The dosing interval  has also changed from 2 pills every 4-6 hours to 2 pills every 6 hours.  If you feel your pain relief is insufficient, you may take Tylenol/Acetaminophen in addition to your narcotic pain medication.   Be careful not to exceed 3,000 mg of Tylenol/Acetaminophen in a 24 hour period from all sources.  If you are taking extra strength Tylenol/acetaminophen (500 mg), the maximum dose is 6 tablets in 24 hours.  If you are taking regular strength acetaminophen (325 mg), the maximum dose is 9 tablets in 24 hours.  You received Toradol at 9:06 AM today. No NSAID's before 3:06 PM as needed per package instruction.    Call a doctor for any of the following:  Signs of infection (fever, growing tenderness at the surgery site, a large amount of drainage or bleeding, severe pain, foul-smelling drainage, redness, swelling).  It has been over 8 to 10 hours since surgery and you are still not able to urinate (pass water).  Headache for over 24 hours.  Signs of Covid-19 infection (temperature over 100 degrees, shortness of breath, cough, loss of taste/smell, generalized body aches, persistent headache, chills, sore throat, nausea/vomiting/diarrhea)  Your doctor is:  Dr. Jed Isaacs, General Surgery: [phone]                 Or dial [phone] and ask for the resident on call for:  General Surgery  For emergency care, call the:  [facility]:  [phone] (TTY for hearing impaired: [phone])

## 2023-03-29 NOTE — ANESTHESIA POSTPROCEDURE EVALUATION
Patient: Adam Salcido    Procedure: Procedure(s):  Laparoscopic Right Inguinal and Umbilical Hernia Repair, Robotic  Assist       Anesthesia Type:  General    Note:  Disposition: Outpatient   Postop Pain Control: Uneventful            Sign Out: Well controlled pain   PONV: No   Neuro/Psych: Uneventful            Sign Out: Acceptable/Baseline neuro status   Airway/Respiratory: Uneventful            Sign Out: Acceptable/Baseline resp. status   CV/Hemodynamics: Uneventful            Sign Out: Acceptable CV status; No obvious hypovolemia; No obvious fluid overload   Other NRE: NONE   DID A NON-ROUTINE EVENT OCCUR? No           Last vitals:  Vitals Value Taken Time   /70 03/29/23 0945   Temp 36.8  C (98.2  F) 03/29/23 0945   Pulse 65 03/29/23 0946   Resp 6 03/29/23 0946   SpO2 96 % 03/29/23 0947   Vitals shown include unvalidated device data.    Electronically Signed By: Giovanni Gamble MD  March 29, 2023  9:53 AM

## 2023-03-29 NOTE — ANESTHESIA CARE TRANSFER NOTE
Patient: Adam Salcido    Procedure: Procedure(s):  Laparoscopic Right Inguinal and Umbilical Hernia Repair, Robotic  Assist       Diagnosis: Non-recurrent unilateral inguinal hernia without obstruction or gangrene [K40.90]  Umbilical hernia without obstruction and without gangrene [K42.9]  Diagnosis Additional Information: No value filed.    Anesthesia Type:   General     Note:    Oropharynx: spontaneously breathing  Level of Consciousness: awake  Oxygen Supplementation: face mask  Level of Supplemental Oxygen (L/min / FiO2): 6  Independent Airway: airway patency satisfactory and stable  Dentition: dentition unchanged  Vital Signs Stable: post-procedure vital signs reviewed and stable  Report to RN Given: handoff report given  Patient transferred to: PACU    Handoff Report: Identifed the Patient, Identified the Reponsible Provider, Reviewed the pertinent medical history, Discussed the surgical course, Reviewed Intra-OP anesthesia mangement and issues during anesthesia, Set expectations for post-procedure period and Allowed opportunity for questions and acknowledgement of understanding      Vitals:  Vitals Value Taken Time   /82    Temp 96.8    Pulse 65    Resp 20    SpO2 98        Electronically Signed By: VIJAY Olvera CRNA  March 29, 2023  9:24 AM

## 2023-03-31 ENCOUNTER — TELEPHONE (OUTPATIENT)
Dept: SURGERY | Facility: CLINIC | Age: 58
End: 2023-03-31
Payer: MEDICARE

## 2023-03-31 ENCOUNTER — PATIENT OUTREACH (OUTPATIENT)
Dept: ENDOCRINOLOGY | Facility: CLINIC | Age: 58
End: 2023-03-31
Payer: MEDICARE

## 2023-03-31 NOTE — TELEPHONE ENCOUNTER
[facility] Call Center    Phone Message    May a detailed message be left on voicemail: yes     Reason for Call: Medication Refill Request    Has the patient contacted the pharmacy for the refill? Yes   Name of medication being requested: oxyCODONE (ROXICODONE) 5 MG tablet  Provider who prescribed the medication: Jed Mcconnell MD  Pharmacy:[place], [place]   Date medication is needed: ASAP    Pt is requesting a call back to discuss getting a refill for the medication. Pt is requesting more than 6 pills, as they stated they have a high tolerance.     Action Taken: Message routed to:  [facility] ([facility]  Gen Surg    Travel Screening: Not Applicable

## 2023-03-31 NOTE — PROGRESS NOTES
"RN Post-Op/Post-Discharge Care Coordination Note    Mr. Adam Salcido is a 57 year old male who underwent RIH and umbilical hernia repair on 3/29/23 with  Dr. Sanket Isaacs.  Spoke with Patient.    Support  Patient able to care for self independently     Health Status  Nausea/Vomiting: Patient denies nausea/vomiting.  Eating/drinking: Patient is able to eat and drink without any complaints.  Bowel habits: Patient reports having a normal bowel movement.  Drains (VERENA): N/A  Fevers/chills: Patient denies any fever or chills.  Incisions: Patient denies any signs and symptoms of infection..  Wound closure:  Skin Sealant  Pain: 8 - using ice and medication.  Encouraged patient to use Tylenol in between the oxycodone.  New Medications:  Oxycodone - was taking 1 tablet every 6-8 hours.  Patient states he takes high dose ibuprofen, muscle relaxers and Tramadol for back pain.  Patient requesting refill of pain medication and states the current dose \"does not even touch the pain\" he is having.    Per Dr. Isaacs: patient to use ice packs on umbilicus and under scrotum.  Can restart Tramadol.    Activity/Restrictions  No lifting in excess of 15-20 pounds for 3-4 weeks    Equipment  Athletic Supporter    Pathology reviewed with patient:  N/A    Forms/Letters  No    All of her questions were answered including reviewing restrictions, and wound care.  He will call this office if he has any further questions and/or concerns.      In lieu of a post-op clinic patient that patient would like to be contacted in approximately 7-10 days via telephone.    A copy of this note was routed to the primary surgeon.      Whom and When to Call  Patient acknowledges understanding of how to manage any medication changes and   when to seek medical care.     Patient advised that if after hour medical concerns arise to please call [phone] and choose option 4 to speak to the physician on call.                 "

## 2023-04-10 ENCOUNTER — PATIENT OUTREACH (OUTPATIENT)
Dept: ENDOCRINOLOGY | Facility: CLINIC | Age: 58
End: 2023-04-10
Payer: MEDICARE

## 2023-04-10 ENCOUNTER — TELEPHONE (OUTPATIENT)
Dept: SURGERY | Facility: CLINIC | Age: 58
End: 2023-04-10
Payer: MEDICARE

## 2023-04-10 NOTE — TELEPHONE ENCOUNTER
Patient had hernia repair with Dr. Isaacs on 3/29. He would like a nurse to call him because he has some questions. Will forward to clinic RN.

## 2023-04-10 NOTE — PROGRESS NOTES
Patient states he has a bump the size of his thumb.  States he is still bruised. Reassured patient that bruising will resolve.  Can use a heating pad on the area to encourage reabsorption. He is using his support.  Reminded patient no heavy lifting and to be careful with bending and twisting.

## 2023-04-11 ENCOUNTER — TELEPHONE (OUTPATIENT)
Dept: ENDOCRINOLOGY | Facility: CLINIC | Age: 58
End: 2023-04-11
Payer: MEDICARE

## 2023-04-17 ENCOUNTER — CARE COORDINATION (OUTPATIENT)
Dept: ENDOCRINOLOGY | Facility: CLINIC | Age: 58
End: 2023-04-17
Payer: MEDICARE

## 2023-04-17 NOTE — PROGRESS NOTES
"Patient called with update regarding his Laparoscopic Right Inguinal and Umbilical Hernia Repair, Robotic  Assisted on 3/29. He noticed a \"bulge\" by his right testicle about a week ago, it is soft, non tender, he has been wearing his supporter and is pretty sore if he doesn't wear it.   Also, on the left side of his abdomen he has a \"hard knob\" that is still black and blue but seems to be getting better. When he stands up this area seems to stick out.   He just wanted to update Dr Isaacs and find out if these are normal findings.     Denies fever, chills and is having normal bowel movements.       Dr Isaacs informed of above.   "

## 2023-07-07 ENCOUNTER — TELEPHONE (OUTPATIENT)
Dept: SURGERY | Facility: CLINIC | Age: 58
End: 2023-07-07
Payer: MEDICARE

## 2023-07-07 NOTE — TELEPHONE ENCOUNTER
[facility] Call Center    Phone Message    May a detailed message be left on voicemail: yes     Reason for Call: Other: Michael is calling in asking for a call back. Pt states that he would like to speak with his care team, as he has had a new hernia pop out. pt would like to also discuss getting new prescriptions for tramadol and ibuprofin. Please call back as soon as possible to discuss.     Action Taken: Message routed to:  [facility] Gen Surg    Travel Screening: Not Applicable

## 2023-07-07 NOTE — TELEPHONE ENCOUNTER
About 2 weeks ago patient noticed an area to the right of his belly button sticking out and about three days ago he noticed it was larger, fluid filled and the size of his thumb and sticking out about an inch. It popped out more when he bent over. Patient states he has been having normal bowel movements and denies pain.     Will inform Dr Isaacs of above and let patient know next steps.

## 2023-07-07 NOTE — TELEPHONE ENCOUNTER
Dr Isaacs informed of patients possible hernia. No new orders at this time. Patient scheduled to see Dr Isaacs July 20 in clinic. Patient verbalized understanding.

## 2023-07-20 ENCOUNTER — OFFICE VISIT (OUTPATIENT)
Dept: SURGERY | Facility: CLINIC | Age: 58
End: 2023-07-20
Payer: MEDICARE

## 2023-07-20 ENCOUNTER — PATIENT OUTREACH (OUTPATIENT)
Dept: ENDOCRINOLOGY | Facility: CLINIC | Age: 58
End: 2023-07-20

## 2023-07-20 VITALS
HEIGHT: 69 IN | DIASTOLIC BLOOD PRESSURE: 94 MMHG | OXYGEN SATURATION: 100 % | HEART RATE: 59 BPM | BODY MASS INDEX: 30.57 KG/M2 | WEIGHT: 206.4 LBS | SYSTOLIC BLOOD PRESSURE: 164 MMHG

## 2023-07-20 DIAGNOSIS — K42.9 UMBILICAL HERNIA WITHOUT OBSTRUCTION AND WITHOUT GANGRENE: Primary | ICD-10-CM

## 2023-07-20 PROCEDURE — 99214 OFFICE O/P EST MOD 30 MIN: CPT | Performed by: SURGERY

## 2023-07-20 ASSESSMENT — PAIN SCALES - GENERAL: PAINLEVEL: SEVERE PAIN (7)

## 2023-07-20 NOTE — PROGRESS NOTES
"Patient was previously seen in clinic for evaluation of inguinal hernia and I performed surgery on him in March 2023.    Had primary repair of umbilical hernia.    Has quite severe umbilical hernia which is ~2cm in size; causes pain; has some very minimal skin color change due to thin skin; no active cirrhosis.    No bowel obstructions.  No prior hospitalizations due to this hernia.    They underwent the following evaluation since the previous visit and results include:  None.      Adam Salcido overall has had the following complaints since the last visit: hernia popped shortly after the inguinal hernia repair.    Has a minor twinge of pain at right groin.    On exam:  BP (!) 164/94 (BP Location: Left arm, Patient Position: Sitting, Cuff Size: Adult Regular)   Pulse 59   Ht 1.753 m (5' 9\")   Wt 93.6 kg (206 lb 6.4 oz)   SpO2 100%   BMI 30.48 kg/m    Gen: alert, NAD  Lung exam: breathing unlabored  Abdominal exam: soft; nontender; nondistended; incisions c/d/i    There is a 2cm fascial defect right inguinal hernia.    Should have IPOM incisional hernia repair with mesh.    I can do this in [place] at the hospital since he lives just 20 minutes from the hospital.    LABS:   Ordered.      Last complete blood count:  Lab Results   Component Value Date    WBC 3.1 11/29/2022     Lab Results   Component Value Date    RBC 3.44 11/29/2022     Lab Results   Component Value Date    HGB 11.7 11/29/2022     Lab Results   Component Value Date    HCT 34.8 11/29/2022     Lab Results   Component Value Date     11/29/2022     Lab Results   Component Value Date    MCH 34.0 11/29/2022     Lab Results   Component Value Date    MCHC 33.6 11/29/2022     Lab Results   Component Value Date    RDW 13.7 11/29/2022     Lab Results   Component Value Date    PLT 51 11/29/2022       Lab Results   Component Value Date    AST 24 11/29/2022     Lab Results   Component Value Date    ALT 18 11/29/2022     Lab Results   Component Value " Date    ALBUMIN 4.1 11/29/2022     Lab Results   Component Value Date    PROTTOTAL 6.7 11/29/2022       Last Basic Metabolic Panel:  Lab Results   Component Value Date     11/29/2022      Lab Results   Component Value Date    POTASSIUM 4.1 11/29/2022     Lab Results   Component Value Date    CHLORIDE 104 11/29/2022     Lab Results   Component Value Date    SONIYA 9.6 11/29/2022     Lab Results   Component Value Date    CO2 26 11/29/2022     Lab Results   Component Value Date    BUN 16.0 11/29/2022     Lab Results   Component Value Date    CR 0.76 11/29/2022     Lab Results   Component Value Date    GLC 89 11/29/2022       IMAGING:   Not needed for this.    Upper endoscopy:  n/a    Plan:    Laparoscopic umbilical hernia repair with mesh (12 cm [place] of mesh; absorbatack sutures).    Same day surgery to be done in [place].    Call Arielle for scheduling; she will need this note from clinic.    [phone]    She will need note and order for scheduling.    Jed Isaacs MD  Surgery  [phone] (hospital )  [phone] (clinic nurses)        The risks of hernia repair were reviewed with the patient.    These risks combine the risks of abdominal surgery and the risks of hernia repair, including mesh implantation, and were described to the patient as follows:    Abdominal surgery risks:    These include, but are not limited to, death, myocardial infarction, pneumonia, urinary tract infection, deep venous thrombosis with or without pulmonary embolus, abdominal infection from bowel injury or abscess, bowel obstruction, wound infection, and bleeding.    Hernia repair risks:    Food and Drug Administration Comments on Hernia Repair Surgery and Mesh Implantation.    http://www.fda.gov/MedicalDevices/ProductsandMedicalProcedures/ImplantsandProsthetics/HerniaSurgicalMesh/default.htm      Hernia Repair Complications    Based on FDA s analysis of medical device adverse event reports and of peer-reviewed,  scientific literature, the most common adverse events for all surgical repair of hernias--with or without mesh--are pain, infection, hernia recurrence, scar-like tissue that sticks tissues together (adhesion), blockage of the large or small intestine (obstruction), bleeding, abnormal connection between organs, vessels, or intestines (fistula), fluid build-up at the surgical site (seroma), and a hole in neighboring tissues or organs (perforation).    The most common adverse events following hernia repair with mesh are pain, infection, hernia recurrence, adhesion, and bowel obstruction. Some other potential adverse events that can occur following hernia repair with mesh are mesh migration and mesh shrinkage (contraction).    Many complications related to hernia repair with surgical mesh that have been reported to the FDA have been associated with recalled mesh products that are no longer on the market. Pain, infection, recurrence, adhesion, obstruction, and perforation are the most common complications associated with recalled mesh. In the FDA s analysis of medical adverse event reports to the FDA, recalled mesh products were the main cause of bowel perforation and obstruction complications.    Please refer to the recall notices here and here for more information if you have recalled mesh. For more information on the recalled products, please visit the FDA Medical Device Recall website. Please visit the Medical & Radiation Emitting Device Database to search a specific type of surgical mesh.    If you are unsure about the specific mesh  and brand used in your surgery and have questions about your hernia repair, contact your surgeon or the facility where your surgery was performed to obtain the information from your medical record.             No orders of the defined types were placed in this encounter.          Jed Isaacs MD  Surgery  [phone] (hospital )  [phone] (clinic  nurses)

## 2023-07-20 NOTE — LETTER
"7/20/2023       RE: Adam Salcido  [address] [address]     Dear Colleague,    Thank you for referring your patient, Adam Salcido, to the [facility] [place] at [facility]. Please see a copy of my visit note below.    Patient was previously seen in clinic for evaluation of inguinal hernia and I performed surgery on him in March 2023.    Had primary repair of umbilical hernia.    Has quite severe umbilical hernia which is ~2cm in size; causes pain; has some very minimal skin color change due to thin skin; no active cirrhosis.    No bowel obstructions.  No prior hospitalizations due to this hernia.    They underwent the following evaluation since the previous visit and results include:  None.      Adam Salcido overall has had the following complaints since the last visit: hernia popped shortly after the inguinal hernia repair.    Has a minor twinge of pain at right groin.    On exam:  BP (!) 164/94 (BP Location: Left arm, Patient Position: Sitting, Cuff Size: Adult Regular)   Pulse 59   Ht 1.753 m (5' 9\")   Wt 93.6 kg (206 lb 6.4 oz)   SpO2 100%   BMI 30.48 kg/m    Gen: alert, NAD  Lung exam: breathing unlabored  Abdominal exam: soft; nontender; nondistended; incisions c/d/i    There is a 2cm fascial defect right inguinal hernia.    Should have IPOM incisional hernia repair with mesh.    I can do this in [place] at the hospital since he lives just 20 minutes from the hospital.    LABS:   Ordered.      Last complete blood count:  Lab Results   Component Value Date    WBC 3.1 11/29/2022     Lab Results   Component Value Date    RBC 3.44 11/29/2022     Lab Results   Component Value Date    HGB 11.7 11/29/2022     Lab Results   Component Value Date    HCT 34.8 11/29/2022     Lab Results   Component Value Date     11/29/2022     Lab Results   Component Value Date    MCH 34.0 11/29/2022     Lab Results "   Component Value Date    MCHC 33.6 11/29/2022     Lab Results   Component Value Date    RDW 13.7 11/29/2022     Lab Results   Component Value Date    PLT 51 11/29/2022       Lab Results   Component Value Date    AST 24 11/29/2022     Lab Results   Component Value Date    ALT 18 11/29/2022     Lab Results   Component Value Date    ALBUMIN 4.1 11/29/2022     Lab Results   Component Value Date    PROTTOTAL 6.7 11/29/2022       Last Basic Metabolic Panel:  Lab Results   Component Value Date     11/29/2022      Lab Results   Component Value Date    POTASSIUM 4.1 11/29/2022     Lab Results   Component Value Date    CHLORIDE 104 11/29/2022     Lab Results   Component Value Date    SONIYA 9.6 11/29/2022     Lab Results   Component Value Date    CO2 26 11/29/2022     Lab Results   Component Value Date    BUN 16.0 11/29/2022     Lab Results   Component Value Date    CR 0.76 11/29/2022     Lab Results   Component Value Date    GLC 89 11/29/2022       IMAGING:   Not needed for this.    Upper endoscopy:  n/a    Plan:    Laparoscopic umbilical hernia repair with mesh (12 cm [place] of mesh; absorbatack sutures).    Same day surgery to be done in [place].    Call Arielle for scheduling; she will need this note from clinic.    [phone]    She will need note and order for scheduling.    Jed Isaacs MD  Surgery  [phone] (hospital )  [phone] (clinic nurses)        The risks of hernia repair were reviewed with the patient.    These risks combine the risks of abdominal surgery and the risks of hernia repair, including mesh implantation, and were described to the patient as follows:    Abdominal surgery risks:    These include, but are not limited to, death, myocardial infarction, pneumonia, urinary tract infection, deep venous thrombosis with or without pulmonary embolus, abdominal infection from bowel injury or abscess, bowel obstruction, wound infection, and bleeding.    Hernia repair risks:    Food and  Drug Administration Comments on Hernia Repair Surgery and Mesh Implantation.    http://www.fda.gov/MedicalDevices/ProductsandMedicalProcedures/ImplantsandProsthetics/HerniaSurgicalMesh/default.htm      Hernia Repair Complications    Based on FDA s analysis of medical device adverse event reports and of peer-reviewed, scientific literature, the most common adverse events for all surgical repair of hernias--with or without mesh--are pain, infection, hernia recurrence, scar-like tissue that sticks tissues together (adhesion), blockage of the large or small intestine (obstruction), bleeding, abnormal connection between organs, vessels, or intestines (fistula), fluid build-up at the surgical site (seroma), and a hole in neighboring tissues or organs (perforation).    The most common adverse events following hernia repair with mesh are pain, infection, hernia recurrence, adhesion, and bowel obstruction. Some other potential adverse events that can occur following hernia repair with mesh are mesh migration and mesh shrinkage (contraction).    Many complications related to hernia repair with surgical mesh that have been reported to the FDA have been associated with recalled mesh products that are no longer on the market. Pain, infection, recurrence, adhesion, obstruction, and perforation are the most common complications associated with recalled mesh. In the FDA s analysis of medical adverse event reports to the FDA, recalled mesh products were the main cause of bowel perforation and obstruction complications.    Please refer to the recall notices here and here for more information if you have recalled mesh. For more information on the recalled products, please visit the FDA Medical Device Recall website. Please visit the Medical & Radiation Emitting Device Database to search a specific type of surgical mesh.    If you are unsure about the specific mesh  and brand used in your surgery and have questions about  your hernia repair, contact your surgeon or the facility where your surgery was performed to obtain the information from your medical record.       No orders of the defined types were placed in this encounter.          Again, thank you for allowing me to participate in the care of your patient.      Sincerely,    Jed Isaacs MD

## 2023-07-20 NOTE — PROGRESS NOTES
Patient notified to have labs done with [facility] [place].  Orders were placed by Dr. Isaacs.

## 2023-07-20 NOTE — NURSING NOTE
"Chief Complaint   Patient presents with     RECHECK     Hernia repair post-op       Vitals:    07/20/23 1023   BP: (!) 164/94   BP Location: Left arm   Patient Position: Sitting   Cuff Size: Adult Regular   Pulse: 59   SpO2: 100%   Weight: 93.6 kg (206 lb 6.4 oz)   Height: 1.753 m (5' 9\")       Body mass index is 30.48 kg/m .                          Gaston Crawford, EMT    "

## (undated) DEVICE — DAVINCI XI DRAPE COLUMN 470341

## (undated) DEVICE — DRSG TEGADERM 4X4 3/4" 1626W

## (undated) DEVICE — ANTIFOG SOLUTION W/FOAM PAD 31142527

## (undated) DEVICE — TAPE MEDIPORE 4"X2YD 2864

## (undated) DEVICE — NDL INSUFFLATION 13GA 120MM C2201

## (undated) DEVICE — PREP CHLORAPREP 26ML TINTED ORANGE  260815

## (undated) DEVICE — SU MONOCRYL 4-0 PS-2 27" UND Y426H

## (undated) DEVICE — ADH SKIN CLOSURE PREMIERPRO EXOFIN 1.0ML 3470

## (undated) DEVICE — PACK LAP CHOLE CUSTOM ASC

## (undated) DEVICE — DRAPE MAYO STAND 23X54 8337

## (undated) DEVICE — DAVINCI XI NDL DRIVER LARGE 470006

## (undated) DEVICE — DRSG GAUZE 4X4" 3033

## (undated) DEVICE — DAVINCI XI GRASPER ENDOWRIST PROGRASP 470093

## (undated) DEVICE — DAVINCI HOT SHEARS TIP COVER  400180

## (undated) DEVICE — SUPPORTER ATHLETIC LG LATEX 202636

## (undated) DEVICE — BLADE CLIPPER SGL USE 9680

## (undated) DEVICE — DAVINCI XI MONOPOLAR SCISSORS HOT SHEARS 8MM 470179

## (undated) DEVICE — SU VICRYL 3-0 SH 27" J316H

## (undated) DEVICE — DAVINCI XI DRAPE ARM 470015

## (undated) DEVICE — LINEN TOWEL PACK X5 5464

## (undated) DEVICE — SU STRATAFIX MONOCRYL 3-0 SPIRAL PS-2 45CM SXMP1B107

## (undated) DEVICE — SYR 50ML SLIP TIP W/O NDL 309654

## (undated) DEVICE — DAVINCI XI SEAL UNIVERSAL 5-8MM 470361

## (undated) DEVICE — GLOVE BIOGEL PI ULTRATOUCH SZ 7.5 41175

## (undated) DEVICE — SOL WATER IRRIG 500ML BOTTLE 2F7113

## (undated) DEVICE — ESU GROUND PAD ADULT W/CORD E7507

## (undated) DEVICE — GOWN XLG DISP 9545

## (undated) DEVICE — DEVICE SUTURE PASSER 14GA WECK EFX EFXSP2

## (undated) DEVICE — SU VICRYL 0 TIE 54" J608H

## (undated) RX ORDER — OXYCODONE HYDROCHLORIDE 5 MG/1
TABLET ORAL
Status: DISPENSED
Start: 2023-03-29

## (undated) RX ORDER — BUPIVACAINE HYDROCHLORIDE 2.5 MG/ML
INJECTION, SOLUTION EPIDURAL; INFILTRATION; INTRACAUDAL
Status: DISPENSED
Start: 2023-03-29

## (undated) RX ORDER — PROPOFOL 10 MG/ML
INJECTION, EMULSION INTRAVENOUS
Status: DISPENSED
Start: 2023-03-29

## (undated) RX ORDER — GLYCOPYRROLATE 0.2 MG/ML
INJECTION INTRAMUSCULAR; INTRAVENOUS
Status: DISPENSED
Start: 2023-03-29

## (undated) RX ORDER — HYDROMORPHONE HYDROCHLORIDE 1 MG/ML
INJECTION, SOLUTION INTRAMUSCULAR; INTRAVENOUS; SUBCUTANEOUS
Status: DISPENSED
Start: 2023-03-29

## (undated) RX ORDER — KETOROLAC TROMETHAMINE 30 MG/ML
INJECTION, SOLUTION INTRAMUSCULAR; INTRAVENOUS
Status: DISPENSED
Start: 2023-03-29

## (undated) RX ORDER — FENTANYL CITRATE 50 UG/ML
INJECTION, SOLUTION INTRAMUSCULAR; INTRAVENOUS
Status: DISPENSED
Start: 2023-03-29

## (undated) RX ORDER — BUPIVACAINE HYDROCHLORIDE 5 MG/ML
INJECTION, SOLUTION EPIDURAL; INTRACAUDAL
Status: DISPENSED
Start: 2023-03-29

## (undated) RX ORDER — DEXAMETHASONE SODIUM PHOSPHATE 4 MG/ML
INJECTION, SOLUTION INTRA-ARTICULAR; INTRALESIONAL; INTRAMUSCULAR; INTRAVENOUS; SOFT TISSUE
Status: DISPENSED
Start: 2023-03-29

## (undated) RX ORDER — ONDANSETRON 2 MG/ML
INJECTION INTRAMUSCULAR; INTRAVENOUS
Status: DISPENSED
Start: 2023-03-29

## (undated) RX ORDER — ACETAMINOPHEN 325 MG/1
TABLET ORAL
Status: DISPENSED
Start: 2023-03-29

## (undated) RX ORDER — CEFAZOLIN SODIUM 2 G/50ML
SOLUTION INTRAVENOUS
Status: DISPENSED
Start: 2023-03-29